# Patient Record
Sex: MALE | Race: BLACK OR AFRICAN AMERICAN | NOT HISPANIC OR LATINO | URBAN - METROPOLITAN AREA
[De-identification: names, ages, dates, MRNs, and addresses within clinical notes are randomized per-mention and may not be internally consistent; named-entity substitution may affect disease eponyms.]

---

## 2020-07-10 ENCOUNTER — OFFICE VISIT (OUTPATIENT)
Dept: URBAN - METROPOLITAN AREA CLINIC 80 | Facility: CLINIC | Age: 65
End: 2020-07-10
Payer: MEDICARE

## 2020-07-10 DIAGNOSIS — K92.1 HEMATOCHEZIA: ICD-10-CM

## 2020-07-10 DIAGNOSIS — K62.6 STERCORAL ULCER OF RECTUM: ICD-10-CM

## 2020-07-10 DIAGNOSIS — K59.01 CONSTIPATION: ICD-10-CM

## 2020-07-10 DIAGNOSIS — R13.10 DYSPHAGIA: ICD-10-CM

## 2020-07-10 DIAGNOSIS — Z86.010 HISTORY OF COLON POLYPS: ICD-10-CM

## 2020-07-10 DIAGNOSIS — K64.9 HEMORRHOIDS: ICD-10-CM

## 2020-07-10 DIAGNOSIS — R15.9 FECAL INCONTINENCE: ICD-10-CM

## 2020-07-10 DIAGNOSIS — B20 HIV (HUMAN IMMUNODEFICIENCY VIRUS INFECTION): ICD-10-CM

## 2020-07-10 DIAGNOSIS — K29.70 GASTRITIS: ICD-10-CM

## 2020-07-10 DIAGNOSIS — N18.9 CKD (CHRONIC KIDNEY DISEASE): ICD-10-CM

## 2020-07-10 PROCEDURE — 3017F COLORECTAL CA SCREEN DOC REV: CPT | Performed by: INTERNAL MEDICINE

## 2020-07-10 PROCEDURE — G9903 PT SCRN TBCO ID AS NON USER: HCPCS | Performed by: INTERNAL MEDICINE

## 2020-07-10 PROCEDURE — G8420 CALC BMI NORM PARAMETERS: HCPCS | Performed by: INTERNAL MEDICINE

## 2020-07-10 PROCEDURE — 46611 ANOSCOPY: CPT | Performed by: INTERNAL MEDICINE

## 2020-07-10 PROCEDURE — G8427 DOCREV CUR MEDS BY ELIG CLIN: HCPCS | Performed by: INTERNAL MEDICINE

## 2020-07-10 PROCEDURE — 99214 OFFICE O/P EST MOD 30 MIN: CPT | Performed by: INTERNAL MEDICINE

## 2020-07-10 RX ORDER — LUBIPROSTONE 8 UG/1
TAKE 1 CAPSULE (8 MCG) BY ORAL ROUTE 2 TIMES PER DAY WITH FOOD AND WATER FOR 90 DAYS CAPSULE, GELATIN COATED ORAL 2
Qty: 180 | Refills: 3 | Status: ACTIVE | COMMUNITY
Start: 2017-10-19

## 2020-07-10 RX ORDER — BIFIDOBACTERIUM LONGUM 10MM CELL
TAKE 1 CAPSULE BY ORAL ROUTE DAILY FOR 30 DAYS CAPSULE ORAL 1
Qty: 30 | Refills: 11 | Status: ACTIVE | COMMUNITY
Start: 2017-08-14

## 2020-07-10 RX ORDER — PANTOPRAZOLE SODIUM 20 MG
TAKE 1 TABLET (20 MG) BY ORAL ROUTE ONCE DAILY FOR 90 DAYS TABLET, DELAYED RELEASE (ENTERIC COATED) ORAL
Qty: 90 | Refills: 11 | Status: ACTIVE | COMMUNITY
Start: 2018-05-17

## 2020-07-10 RX ORDER — POLYETHYLENE GLYOCOL 3350, SODIUM CHLORIDE, SODIUM BICARBONATE AND POTASSIUM CHLORIDE 420; 11.2; 5.72; 1.48 G/4L; G/4L; G/4L; G/4L
AS DIRECTED POWDER, FOR SOLUTION NASOGASTRIC; ORAL ONCE
Qty: 420 | Refills: 0 | OUTPATIENT
Start: 2020-07-10

## 2020-07-10 RX ORDER — METRONIDAZOLE 500 MG/1
TAKE 1 TABLET (500 MG) BY ORAL ROUTE 2 TIMES PER DAY FOR 7 DAYS TABLET, FILM COATED ORAL 2
Qty: 14 | Refills: 0 | Status: ACTIVE | COMMUNITY
Start: 2017-08-14

## 2020-07-10 RX ORDER — PANTOPRAZOLE SODIUM 40 MG
TAKE 1 TABLET (40 MG) BY ORAL ROUTE ONCE DAILY FOR 90 DAYS TABLET, DELAYED RELEASE (ENTERIC COATED) ORAL
Qty: 90 | Refills: 3 | Status: ACTIVE | COMMUNITY
Start: 2018-05-22

## 2020-07-10 RX ORDER — FUROSEMIDE 40 MG/1
TABLET ORAL
Qty: 0 | Refills: 0 | Status: ACTIVE | COMMUNITY
Start: 1900-01-01

## 2020-07-10 NOTE — HPI-OTHER HISTORIES
The patient is a 65 year old /Black male , previously referred by Hilaria Lawson and Tran Babcock MD , comes in for hematochezia.  last seen in 2/2020.  here for follow up.   pt notes egd and flex sig ordered last time for surveillacnce did not get done he notes 2 episodes of TIA last noted on 6/23/2020 he comes in for hematochezia that started 4 days ago notes maroon stool with 5-6 bm/day it is ongoing with "purple" stool no assoc rectal pain he does note soft to formed stool he does not issues with control occ issues with initiating bm no assoc abd pain, nausea or vomiting normal appetite with no dysphagia he has lost  15 lb in the last few months attributed to his hospitalization and recent stroke h/h is stable with last labs on 7/7/2020 at 11.1/34.8  prior hospital admission for gi bleed in 10/2019 with  anemia with presyncope.  h/h was low at 6.7/20.1.   he had a h colonoscopy notable for internal hemorrhoids, 1.5 cm stercoral ulcer, a small polyp and melanosis.   he had an egd in 12/2019 with gastritis and duodenitis no follow up eval since.   hx of anal fissure tx with tx with amitiza and nitroglycerine cream.   He notes a colonoscopy > 5 yrs ago. he notes a large polyp found and he had a partial colectomy for it. repeat colonsocopy in 10/2016 with polyps noted. 3 year f/u noted. egd in 9/2016 with gastritis but no h pylori. recent hospital work up for bleeding with colonosocpy on 9/17/17 and again on 9/27/17 for avm tx. egd for abd pain on 9/27/17 with gastritis. no family history of colon cancer, colon polyps, or gastric cancer.

## 2020-07-10 NOTE — PHYSICAL EXAM GASTROINTESTINAL
Abdomen , soft, nontender, nondistended , no guarding or rigidity , no masses palpable , normal bowel sounds , Liver and Spleen , no hepatomegaly present , no hepatosplenomegaly , liver nontender , spleen not palpable   Rectal, pt laid in the left lateral position.  rectal exam with external skin tag on the left side.  phyllis with no anal fissure.  anoscopy with notes small amount of red blood, grade II internal hemorrhoids.  no rectal mass noted.

## 2020-07-29 ENCOUNTER — LAB OUTSIDE AN ENCOUNTER (OUTPATIENT)
Dept: URBAN - METROPOLITAN AREA CLINIC 80 | Facility: CLINIC | Age: 65
End: 2020-07-29

## 2020-07-29 ENCOUNTER — OFFICE VISIT (OUTPATIENT)
Dept: URBAN - METROPOLITAN AREA MEDICAL CENTER 25 | Facility: MEDICAL CENTER | Age: 65
End: 2020-07-29
Payer: MEDICARE

## 2020-07-29 DIAGNOSIS — D12.2 ADENOMA OF ASCENDING COLON: ICD-10-CM

## 2020-07-29 DIAGNOSIS — K29.40 ATROPHIC GASTRITIS: ICD-10-CM

## 2020-07-29 DIAGNOSIS — K55.20 ANGIODYSPLASIA: ICD-10-CM

## 2020-07-29 DIAGNOSIS — K92.1 BLACK STOOL: ICD-10-CM

## 2020-07-29 LAB
GLUCOSE POC: 84
PERFORMING LAB: (no result)

## 2020-07-29 PROCEDURE — 43239 EGD BIOPSY SINGLE/MULTIPLE: CPT | Performed by: INTERNAL MEDICINE

## 2020-07-29 PROCEDURE — 45388 COLONOSCOPY W/ABLATION: CPT | Performed by: INTERNAL MEDICINE

## 2020-07-29 PROCEDURE — 45385 COLONOSCOPY W/LESION REMOVAL: CPT | Performed by: INTERNAL MEDICINE

## 2020-07-29 PROCEDURE — G9937 DIG OR SURV COLSCO: HCPCS | Performed by: INTERNAL MEDICINE

## 2020-07-29 RX ORDER — BIFIDOBACTERIUM LONGUM 10MM CELL
TAKE 1 CAPSULE BY ORAL ROUTE DAILY FOR 30 DAYS CAPSULE ORAL 1
Qty: 30 | Refills: 11 | Status: ACTIVE | COMMUNITY
Start: 2017-08-14

## 2020-07-29 RX ORDER — POLYETHYLENE GLYOCOL 3350, SODIUM CHLORIDE, SODIUM BICARBONATE AND POTASSIUM CHLORIDE 420; 11.2; 5.72; 1.48 G/4L; G/4L; G/4L; G/4L
AS DIRECTED POWDER, FOR SOLUTION NASOGASTRIC; ORAL ONCE
Qty: 420 | Refills: 0 | Status: ACTIVE | COMMUNITY
Start: 2020-07-10

## 2020-07-29 RX ORDER — PANTOPRAZOLE SODIUM 40 MG
TAKE 1 TABLET (40 MG) BY ORAL ROUTE ONCE DAILY FOR 90 DAYS TABLET, DELAYED RELEASE (ENTERIC COATED) ORAL
Qty: 90 | Refills: 3 | Status: ACTIVE | COMMUNITY
Start: 2018-05-22

## 2020-07-29 RX ORDER — LUBIPROSTONE 8 UG/1
TAKE 1 CAPSULE (8 MCG) BY ORAL ROUTE 2 TIMES PER DAY WITH FOOD AND WATER FOR 90 DAYS CAPSULE, GELATIN COATED ORAL 2
Qty: 180 | Refills: 3 | Status: ACTIVE | COMMUNITY
Start: 2017-10-19

## 2020-07-29 RX ORDER — FUROSEMIDE 40 MG/1
TABLET ORAL
Qty: 0 | Refills: 0 | Status: ACTIVE | COMMUNITY
Start: 1900-01-01

## 2020-07-29 RX ORDER — METRONIDAZOLE 500 MG/1
TAKE 1 TABLET (500 MG) BY ORAL ROUTE 2 TIMES PER DAY FOR 7 DAYS TABLET, FILM COATED ORAL 2
Qty: 14 | Refills: 0 | Status: ACTIVE | COMMUNITY
Start: 2017-08-14

## 2020-07-29 RX ORDER — PANTOPRAZOLE SODIUM 20 MG
TAKE 1 TABLET (20 MG) BY ORAL ROUTE ONCE DAILY FOR 90 DAYS TABLET, DELAYED RELEASE (ENTERIC COATED) ORAL
Qty: 90 | Refills: 11 | Status: ACTIVE | COMMUNITY
Start: 2018-05-17

## 2020-09-14 ENCOUNTER — OFFICE VISIT (OUTPATIENT)
Dept: URBAN - METROPOLITAN AREA CLINIC 80 | Facility: CLINIC | Age: 65
End: 2020-09-14
Payer: MEDICARE

## 2020-09-14 DIAGNOSIS — R13.10 DYSPHAGIA: ICD-10-CM

## 2020-09-14 DIAGNOSIS — K92.1 HEMATOCHEZIA: ICD-10-CM

## 2020-09-14 DIAGNOSIS — Z86.010 HISTORY OF COLON POLYPS: ICD-10-CM

## 2020-09-14 DIAGNOSIS — K21.9 GERD: ICD-10-CM

## 2020-09-14 DIAGNOSIS — R06.6 HICCUPS: ICD-10-CM

## 2020-09-14 DIAGNOSIS — R15.9 FECAL INCONTINENCE: ICD-10-CM

## 2020-09-14 DIAGNOSIS — K64.9 HEMORRHOIDS: ICD-10-CM

## 2020-09-14 DIAGNOSIS — K63.89 MELANOSIS COLI: ICD-10-CM

## 2020-09-14 DIAGNOSIS — R18.8 ASCITES: ICD-10-CM

## 2020-09-14 DIAGNOSIS — K59.01 CONSTIPATION: ICD-10-CM

## 2020-09-14 DIAGNOSIS — K55.20 ANGIODYSPLASIA OF COLON: ICD-10-CM

## 2020-09-14 PROCEDURE — 3017F COLORECTAL CA SCREEN DOC REV: CPT | Performed by: INTERNAL MEDICINE

## 2020-09-14 PROCEDURE — G9903 PT SCRN TBCO ID AS NON USER: HCPCS | Performed by: INTERNAL MEDICINE

## 2020-09-14 PROCEDURE — G8427 DOCREV CUR MEDS BY ELIG CLIN: HCPCS | Performed by: INTERNAL MEDICINE

## 2020-09-14 PROCEDURE — 99213 OFFICE O/P EST LOW 20 MIN: CPT | Performed by: INTERNAL MEDICINE

## 2020-09-14 RX ORDER — BIFIDOBACTERIUM LONGUM 10MM CELL
TAKE 1 CAPSULE BY ORAL ROUTE DAILY FOR 30 DAYS CAPSULE ORAL 1
Qty: 30 | Refills: 11 | Status: ACTIVE | COMMUNITY
Start: 2017-08-14

## 2020-09-14 RX ORDER — LUBIPROSTONE 8 UG/1
TAKE 1 CAPSULE (8 MCG) BY ORAL ROUTE 2 TIMES PER DAY WITH FOOD AND WATER FOR 90 DAYS CAPSULE, GELATIN COATED ORAL 2
Qty: 180 | Refills: 3 | Status: ACTIVE | COMMUNITY
Start: 2017-10-19

## 2020-09-14 RX ORDER — PANTOPRAZOLE SODIUM 40 MG
TAKE 1 TABLET (40 MG) BY ORAL ROUTE ONCE DAILY FOR 90 DAYS TABLET, DELAYED RELEASE (ENTERIC COATED) ORAL
Qty: 90 | Refills: 3 | Status: ACTIVE | COMMUNITY
Start: 2018-05-22

## 2020-09-14 RX ORDER — FUROSEMIDE 40 MG/1
TABLET ORAL
Qty: 0 | Refills: 0 | Status: ACTIVE | COMMUNITY
Start: 1900-01-01

## 2020-09-14 RX ORDER — PANTOPRAZOLE SODIUM 20 MG
TAKE 1 TABLET (20 MG) BY ORAL ROUTE ONCE DAILY FOR 90 DAYS TABLET, DELAYED RELEASE (ENTERIC COATED) ORAL
Qty: 90 | Refills: 11 | Status: ACTIVE | COMMUNITY
Start: 2018-05-17

## 2020-09-14 RX ORDER — POLYETHYLENE GLYOCOL 3350, SODIUM CHLORIDE, SODIUM BICARBONATE AND POTASSIUM CHLORIDE 420; 11.2; 5.72; 1.48 G/4L; G/4L; G/4L; G/4L
AS DIRECTED POWDER, FOR SOLUTION NASOGASTRIC; ORAL ONCE
Qty: 420 | Refills: 0 | Status: ACTIVE | COMMUNITY
Start: 2020-07-10

## 2020-09-14 RX ORDER — METRONIDAZOLE 500 MG/1
TAKE 1 TABLET (500 MG) BY ORAL ROUTE 2 TIMES PER DAY FOR 7 DAYS TABLET, FILM COATED ORAL 2
Qty: 14 | Refills: 0 | Status: ACTIVE | COMMUNITY
Start: 2017-08-14

## 2020-09-14 NOTE — HPI-OTHER HISTORIES
The patient is a 65 year old /Black male , previously referred by Hilaria Lawson and Tran Babcock MD , comes in for follow up for hematochezia.    he had an egd/colon in 7/2020 noted rectal avm's.  tx with apc did well until this weekend had some bleeding this weekend for the first time x 2 days.  brbpr with stools had some diarrhea that resolved.   no abd pain no n/v appetite normal weight stable no new complaints  Prior hx of anemia requiring admission in 10/2019 with colonoscopy notable for internal hemorrhoids, 1.5 cm stercoral ulcer, a small polyp and melanosis.   he had an egd in 12/2019 with gastritis and duodenitis  previously starting banding of his hemorrhoid but did not want to continue banding at f/u.  He notes a colonoscopy > 5 yrs ago. he notes a large polyp found and he had a partial colectomy for it. repeat colonsocopy in 10/2016 with polyps noted. 3 year f/u noted. egd in 9/2016 with gastritis but no h pylori. recent hospital work up for bleeding with colonosocpy on 9/17/17 and again on 9/27/17 for avm tx. egd for abd pain on 9/27/17 with gastritis. no family history of colon cancer, colon polyps, or gastric cancer.

## 2020-09-15 LAB
HEMATOCRIT: 30.8
HEMOGLOBIN: 10
MCH: 30.3
MCHC: 32.5
MCV: 93
NRBC: (no result)
PLATELETS: 152
RBC: 3.3
RDW: 15.8
WBC: 4.5

## 2020-11-23 ENCOUNTER — ERX REFILL RESPONSE (OUTPATIENT)
Age: 65
End: 2020-11-23

## 2020-11-23 RX ORDER — PANTOPRAZOLE SODIUM 40 MG/1
TAKE 1 TABLET(40 MG) BY MOUTH EVERY DAY TABLET, DELAYED RELEASE ORAL
Qty: 90 | Refills: 0

## 2021-02-18 ENCOUNTER — ERX REFILL RESPONSE (OUTPATIENT)
Dept: URBAN - METROPOLITAN AREA CLINIC 13 | Facility: CLINIC | Age: 66
End: 2021-02-18

## 2021-02-18 RX ORDER — PANTOPRAZOLE SODIUM 40 MG/1
TAKE 1 TABLET(40 MG) BY MOUTH EVERY DAY TABLET, DELAYED RELEASE ORAL
Qty: 90 | Refills: 0

## 2021-05-19 ENCOUNTER — ERX REFILL RESPONSE (OUTPATIENT)
Dept: URBAN - METROPOLITAN AREA CLINIC 13 | Facility: CLINIC | Age: 66
End: 2021-05-19

## 2021-05-19 RX ORDER — PANTOPRAZOLE SODIUM 40 MG/1
TAKE 1 TABLET(40 MG) BY MOUTH EVERY DAY TABLET, DELAYED RELEASE ORAL
Qty: 90 | Refills: 0

## 2021-08-10 ENCOUNTER — ERX REFILL RESPONSE (OUTPATIENT)
Dept: URBAN - METROPOLITAN AREA CLINIC 80 | Facility: CLINIC | Age: 66
End: 2021-08-10

## 2021-08-10 RX ORDER — PANTOPRAZOLE SODIUM 40 MG/1
TAKE 1 TABLET(40 MG) BY MOUTH EVERY DAY TABLET, DELAYED RELEASE ORAL
Qty: 90 TABLET | Refills: 0 | OUTPATIENT

## 2021-08-10 RX ORDER — PANTOPRAZOLE SODIUM 40 MG/1
TAKE 1 TABLET(40 MG) BY MOUTH EVERY DAY TABLET, DELAYED RELEASE ORAL
Qty: 90 TABLET | Refills: 1 | OUTPATIENT

## 2021-10-06 ENCOUNTER — OFFICE VISIT (OUTPATIENT)
Dept: URBAN - METROPOLITAN AREA CLINIC 80 | Facility: CLINIC | Age: 66
End: 2021-10-06

## 2021-10-06 RX ORDER — PANTOPRAZOLE SODIUM 40 MG
TAKE 1 TABLET (40 MG) BY ORAL ROUTE ONCE DAILY FOR 90 DAYS TABLET, DELAYED RELEASE (ENTERIC COATED) ORAL
Qty: 90 | Refills: 3 | Status: ACTIVE | COMMUNITY
Start: 2018-05-22

## 2021-10-06 RX ORDER — BIFIDOBACTERIUM LONGUM 10MM CELL
TAKE 1 CAPSULE BY ORAL ROUTE DAILY FOR 30 DAYS CAPSULE ORAL 1
Qty: 30 | Refills: 11 | Status: ACTIVE | COMMUNITY
Start: 2017-08-14

## 2021-10-06 RX ORDER — POLYETHYLENE GLYOCOL 3350, SODIUM CHLORIDE, SODIUM BICARBONATE AND POTASSIUM CHLORIDE 420; 11.2; 5.72; 1.48 G/4L; G/4L; G/4L; G/4L
AS DIRECTED POWDER, FOR SOLUTION NASOGASTRIC; ORAL ONCE
Qty: 420 | Refills: 0 | Status: ACTIVE | COMMUNITY
Start: 2020-07-10

## 2021-10-06 RX ORDER — LUBIPROSTONE 8 UG/1
TAKE 1 CAPSULE (8 MCG) BY ORAL ROUTE 2 TIMES PER DAY WITH FOOD AND WATER FOR 90 DAYS CAPSULE, GELATIN COATED ORAL 2
Qty: 180 | Refills: 3 | Status: ACTIVE | COMMUNITY
Start: 2017-10-19

## 2021-10-06 RX ORDER — PANTOPRAZOLE SODIUM 20 MG
TAKE 1 TABLET (20 MG) BY ORAL ROUTE ONCE DAILY FOR 90 DAYS TABLET, DELAYED RELEASE (ENTERIC COATED) ORAL
Qty: 90 | Refills: 11 | Status: ACTIVE | COMMUNITY
Start: 2018-05-17

## 2021-10-06 RX ORDER — METRONIDAZOLE 500 MG/1
TAKE 1 TABLET (500 MG) BY ORAL ROUTE 2 TIMES PER DAY FOR 7 DAYS TABLET, FILM COATED ORAL 2
Qty: 14 | Refills: 0 | Status: ACTIVE | COMMUNITY
Start: 2017-08-14

## 2021-10-06 RX ORDER — PANTOPRAZOLE SODIUM 40 MG/1
TAKE 1 TABLET(40 MG) BY MOUTH EVERY DAY TABLET, DELAYED RELEASE ORAL
Qty: 90 TABLET | Refills: 1 | Status: ACTIVE | COMMUNITY

## 2021-10-06 RX ORDER — FUROSEMIDE 40 MG/1
TABLET ORAL
Qty: 0 | Refills: 0 | Status: ACTIVE | COMMUNITY
Start: 1900-01-01

## 2021-10-19 ENCOUNTER — OFFICE VISIT (OUTPATIENT)
Dept: URBAN - METROPOLITAN AREA CLINIC 80 | Facility: CLINIC | Age: 66
End: 2021-10-19

## 2021-10-19 RX ORDER — METRONIDAZOLE 500 MG/1
TAKE 1 TABLET (500 MG) BY ORAL ROUTE 2 TIMES PER DAY FOR 7 DAYS TABLET, FILM COATED ORAL 2
Qty: 14 | Refills: 0 | COMMUNITY
Start: 2017-08-14

## 2021-10-19 RX ORDER — PANTOPRAZOLE SODIUM 40 MG/1
TAKE 1 TABLET(40 MG) BY MOUTH EVERY DAY TABLET, DELAYED RELEASE ORAL
Qty: 90 TABLET | Refills: 1 | COMMUNITY

## 2021-10-19 RX ORDER — POLYETHYLENE GLYOCOL 3350, SODIUM CHLORIDE, SODIUM BICARBONATE AND POTASSIUM CHLORIDE 420; 11.2; 5.72; 1.48 G/4L; G/4L; G/4L; G/4L
AS DIRECTED POWDER, FOR SOLUTION NASOGASTRIC; ORAL ONCE
Qty: 420 | Refills: 0 | COMMUNITY
Start: 2020-07-10

## 2021-10-19 RX ORDER — FUROSEMIDE 40 MG/1
TABLET ORAL
Qty: 0 | Refills: 0 | COMMUNITY
Start: 1900-01-01

## 2021-10-19 RX ORDER — BIFIDOBACTERIUM LONGUM 10MM CELL
TAKE 1 CAPSULE BY ORAL ROUTE DAILY FOR 30 DAYS CAPSULE ORAL 1
Qty: 30 | Refills: 11 | COMMUNITY
Start: 2017-08-14

## 2021-10-19 RX ORDER — PANTOPRAZOLE SODIUM 40 MG
TAKE 1 TABLET (40 MG) BY ORAL ROUTE ONCE DAILY FOR 90 DAYS TABLET, DELAYED RELEASE (ENTERIC COATED) ORAL
Qty: 90 | Refills: 3 | COMMUNITY
Start: 2018-05-22

## 2021-10-19 RX ORDER — PANTOPRAZOLE SODIUM 20 MG
TAKE 1 TABLET (20 MG) BY ORAL ROUTE ONCE DAILY FOR 90 DAYS TABLET, DELAYED RELEASE (ENTERIC COATED) ORAL
Qty: 90 | Refills: 11 | COMMUNITY
Start: 2018-05-17

## 2021-10-19 RX ORDER — LUBIPROSTONE 8 UG/1
TAKE 1 CAPSULE (8 MCG) BY ORAL ROUTE 2 TIMES PER DAY WITH FOOD AND WATER FOR 90 DAYS CAPSULE, GELATIN COATED ORAL 2
Qty: 180 | Refills: 3 | COMMUNITY
Start: 2017-10-19

## 2021-11-17 ENCOUNTER — OFFICE VISIT (OUTPATIENT)
Dept: URBAN - METROPOLITAN AREA CLINIC 80 | Facility: CLINIC | Age: 66
End: 2021-11-17

## 2021-11-24 ENCOUNTER — ERX REFILL RESPONSE (OUTPATIENT)
Dept: URBAN - METROPOLITAN AREA CLINIC 80 | Facility: CLINIC | Age: 66
End: 2021-11-24

## 2021-11-24 RX ORDER — PANTOPRAZOLE SODIUM 40 MG/1
TAKE 1 TABLET(40 MG) BY MOUTH EVERY DAY TABLET, DELAYED RELEASE ORAL
Qty: 90 TABLET | Refills: 1 | OUTPATIENT

## 2022-01-12 ENCOUNTER — LAB OUTSIDE AN ENCOUNTER (OUTPATIENT)
Dept: URBAN - METROPOLITAN AREA CLINIC 80 | Facility: CLINIC | Age: 67
End: 2022-01-12

## 2022-01-12 ENCOUNTER — WEB ENCOUNTER (OUTPATIENT)
Dept: URBAN - METROPOLITAN AREA CLINIC 80 | Facility: CLINIC | Age: 67
End: 2022-01-12

## 2022-01-12 ENCOUNTER — OFFICE VISIT (OUTPATIENT)
Dept: URBAN - METROPOLITAN AREA CLINIC 80 | Facility: CLINIC | Age: 67
End: 2022-01-12
Payer: MEDICARE

## 2022-01-12 DIAGNOSIS — K59.01 CONSTIPATION: ICD-10-CM

## 2022-01-12 DIAGNOSIS — K21.9 GERD: ICD-10-CM

## 2022-01-12 DIAGNOSIS — K92.1 HEMATOCHEZIA: ICD-10-CM

## 2022-01-12 DIAGNOSIS — R15.9 FECAL INCONTINENCE: ICD-10-CM

## 2022-01-12 DIAGNOSIS — K55.20 ANGIODYSPLASIA OF COLON: ICD-10-CM

## 2022-01-12 DIAGNOSIS — K64.9 HEMORRHOIDS: ICD-10-CM

## 2022-01-12 DIAGNOSIS — K63.89 MELANOSIS COLI: ICD-10-CM

## 2022-01-12 DIAGNOSIS — R06.6 HICCUPS: ICD-10-CM

## 2022-01-12 DIAGNOSIS — R13.10 DYSPHAGIA: ICD-10-CM

## 2022-01-12 DIAGNOSIS — Z86.010 HISTORY OF COLON POLYPS: ICD-10-CM

## 2022-01-12 DIAGNOSIS — D64.9 ANEMIA, UNSPECIFIED TYPE: ICD-10-CM

## 2022-01-12 DIAGNOSIS — R18.8 ASCITES: ICD-10-CM

## 2022-01-12 PROCEDURE — 99214 OFFICE O/P EST MOD 30 MIN: CPT | Performed by: PHYSICIAN ASSISTANT

## 2022-01-12 RX ORDER — POLYETHYLENE GLYOCOL 3350, SODIUM CHLORIDE, SODIUM BICARBONATE AND POTASSIUM CHLORIDE 420; 11.2; 5.72; 1.48 G/4L; G/4L; G/4L; G/4L
AS DIRECTED POWDER, FOR SOLUTION NASOGASTRIC; ORAL ONCE
Qty: 420 | Refills: 0 | Status: DISCONTINUED | COMMUNITY
Start: 2020-07-10

## 2022-01-12 RX ORDER — LUBIPROSTONE 8 UG/1
TAKE 1 CAPSULE (8 MCG) BY ORAL ROUTE 2 TIMES PER DAY WITH FOOD AND WATER FOR 90 DAYS CAPSULE, GELATIN COATED ORAL 2
Qty: 180 | Refills: 3 | Status: DISCONTINUED | COMMUNITY
Start: 2017-10-19

## 2022-01-12 RX ORDER — BIFIDOBACTERIUM LONGUM 10MM CELL
TAKE 1 CAPSULE BY ORAL ROUTE DAILY FOR 30 DAYS CAPSULE ORAL 1
Qty: 30 | Refills: 11 | Status: DISCONTINUED | COMMUNITY
Start: 2017-08-14

## 2022-01-12 RX ORDER — METRONIDAZOLE 500 MG/1
TAKE 1 TABLET (500 MG) BY ORAL ROUTE 2 TIMES PER DAY FOR 7 DAYS TABLET, FILM COATED ORAL 2
Qty: 14 | Refills: 0 | Status: DISCONTINUED | COMMUNITY
Start: 2017-08-14

## 2022-01-12 RX ORDER — FUROSEMIDE 40 MG/1
TABLET ORAL
Qty: 0 | Refills: 0 | Status: DISCONTINUED | COMMUNITY
Start: 1900-01-01

## 2022-01-12 RX ORDER — PANTOPRAZOLE SODIUM 20 MG
TAKE 1 TABLET (20 MG) BY ORAL ROUTE ONCE DAILY FOR 90 DAYS TABLET, DELAYED RELEASE (ENTERIC COATED) ORAL
Qty: 90 | Refills: 11 | Status: DISCONTINUED | COMMUNITY
Start: 2018-05-17

## 2022-01-12 RX ORDER — PANTOPRAZOLE SODIUM 40 MG/1
TAKE 1 TABLET(40 MG) BY MOUTH EVERY DAY TABLET, DELAYED RELEASE ORAL
Qty: 90 TABLET | Refills: 1 | Status: ACTIVE | COMMUNITY

## 2022-01-12 RX ORDER — PANTOPRAZOLE SODIUM 40 MG
TAKE 1 TABLET (40 MG) BY ORAL ROUTE ONCE DAILY FOR 90 DAYS TABLET, DELAYED RELEASE (ENTERIC COATED) ORAL
Qty: 90 | Refills: 3 | Status: DISCONTINUED | COMMUNITY
Start: 2018-05-22

## 2022-01-12 NOTE — HPI-TODAY'S VISIT:
This is a patient with a longstanding history of GERD, hematochezia, anemia, constipation and colon polyps.  His last upper endoscopy was in July 2020 with gastritis.  His last colonoscopy was in July 2020 and was advised for a 3-year follow-up.  In the past he has had small polyps, partial colon resection for polyps, internal hemorrhoids, rectal AVMs requiring APC, and stercoral ulcer with melanosis.  He went to the emergency room on seven 12/20/2021 for left lower quadrant abdominal pain.  He does have a history of end-stage renal disease on dialysis.  In the emergency room blood work showed a hemoglobin of 8.7, hematocrit 26.9, white count 3.48.  He had a CT of his abdomen and pelvis with contrast that showed significant fecal waste which is compatible with constipation and there was diverticulosis.  Looking at his blood work over the last months in June 2021 his hemoglobin was 13.2 and hematocrit 38.6 and then on 12/22/2021 his hemoglobin was 8.2 and hematocrit 25.3.  He does have HIV with a viral load of less than 20. He states since out of the ER his is passing better stools - he bought some ducolax over the counter and it helped him go - he is not taking it anymore - just used it to clean out He is still having some pain on the left side - it feels like the diverticulitis he had that he had a partial colon resection for in the past Pain did not decrease much after he finally had his BM - still there He is having 2-3 BM a day - not on any meds for it - he has changed his diet around some - eating more greens He had no blood in his stool when he strained - then after "the dam broke" he had blood when he wiped.  Did not see any yesterday or today.  It comes and goes No rectal pain, no nausea or emesis No fevers or chills Stools are always dark - because on a binder for his ESRD

## 2022-02-22 ENCOUNTER — ERX REFILL RESPONSE (OUTPATIENT)
Dept: URBAN - METROPOLITAN AREA CLINIC 80 | Facility: CLINIC | Age: 67
End: 2022-02-22

## 2022-02-22 RX ORDER — PANTOPRAZOLE SODIUM 40 MG/1
TAKE 1 TABLET(40 MG) BY MOUTH EVERY DAY TABLET, DELAYED RELEASE ORAL
Qty: 90 TABLET | Refills: 4 | OUTPATIENT

## 2022-02-22 RX ORDER — PANTOPRAZOLE SODIUM 40 MG/1
TAKE 1 TABLET(40 MG) BY MOUTH EVERY DAY TABLET, DELAYED RELEASE ORAL
Qty: 90 TABLET | Refills: 1 | OUTPATIENT

## 2022-03-21 ENCOUNTER — OFFICE VISIT (OUTPATIENT)
Dept: URBAN - METROPOLITAN AREA MEDICAL CENTER 18 | Facility: MEDICAL CENTER | Age: 67
End: 2022-03-21

## 2023-01-09 ENCOUNTER — CLAIMS CREATED FROM THE CLAIM WINDOW (OUTPATIENT)
Dept: URBAN - METROPOLITAN AREA MEDICAL CENTER 18 | Facility: MEDICAL CENTER | Age: 68
End: 2023-01-09
Payer: MEDICARE

## 2023-01-09 DIAGNOSIS — K92.1 ACUTE MELENA: ICD-10-CM

## 2023-01-09 PROCEDURE — 99222 1ST HOSP IP/OBS MODERATE 55: CPT | Performed by: PHYSICIAN ASSISTANT

## 2023-01-09 PROCEDURE — G8427 DOCREV CUR MEDS BY ELIG CLIN: HCPCS | Performed by: PHYSICIAN ASSISTANT

## 2023-01-10 ENCOUNTER — CLAIMS CREATED FROM THE CLAIM WINDOW (OUTPATIENT)
Dept: URBAN - METROPOLITAN AREA MEDICAL CENTER 18 | Facility: MEDICAL CENTER | Age: 68
End: 2023-01-10
Payer: MEDICARE

## 2023-01-10 DIAGNOSIS — D64.89 ANEMIA DUE TO OTHER CAUSE: ICD-10-CM

## 2023-01-10 DIAGNOSIS — D64.9 ANEMIA, UNSPECIFIED: ICD-10-CM

## 2023-01-10 DIAGNOSIS — K92.2 GASTROINTESTINAL HEMORRHAGE, UNSPECIFIED: ICD-10-CM

## 2023-01-10 DIAGNOSIS — K62.5 ANAL BLEEDING: ICD-10-CM

## 2023-01-10 PROCEDURE — 99231 SBSQ HOSP IP/OBS SF/LOW 25: CPT | Performed by: PHYSICIAN ASSISTANT

## 2023-01-10 PROCEDURE — 99231 SBSQ HOSP IP/OBS SF/LOW 25: CPT | Performed by: INTERNAL MEDICINE

## 2023-01-11 ENCOUNTER — CLAIMS CREATED FROM THE CLAIM WINDOW (OUTPATIENT)
Dept: URBAN - METROPOLITAN AREA MEDICAL CENTER 18 | Facility: MEDICAL CENTER | Age: 68
End: 2023-01-11
Payer: MEDICARE

## 2023-01-11 DIAGNOSIS — K62.5 ANAL BLEEDING: ICD-10-CM

## 2023-01-11 DIAGNOSIS — K92.2 GASTROINTESTINAL HEMORRHAGE, UNSPECIFIED: ICD-10-CM

## 2023-01-11 PROCEDURE — 45378 DIAGNOSTIC COLONOSCOPY: CPT | Performed by: INTERNAL MEDICINE

## 2023-01-12 ENCOUNTER — CLAIMS CREATED FROM THE CLAIM WINDOW (OUTPATIENT)
Dept: URBAN - METROPOLITAN AREA MEDICAL CENTER 18 | Facility: MEDICAL CENTER | Age: 68
End: 2023-01-12
Payer: MEDICARE

## 2023-01-12 ENCOUNTER — P2P PATIENT RECORD (OUTPATIENT)
Age: 68
End: 2023-01-12

## 2023-01-12 DIAGNOSIS — K92.2 GASTROINTESTINAL HEMORRHAGE, UNSPECIFIED: ICD-10-CM

## 2023-01-12 DIAGNOSIS — K62.5 ANAL BLEEDING: ICD-10-CM

## 2023-01-12 PROCEDURE — 99231 SBSQ HOSP IP/OBS SF/LOW 25: CPT | Performed by: INTERNAL MEDICINE

## 2023-01-12 PROCEDURE — 99231 SBSQ HOSP IP/OBS SF/LOW 25: CPT | Performed by: PHYSICIAN ASSISTANT

## 2023-02-10 ENCOUNTER — TELEPHONE ENCOUNTER (OUTPATIENT)
Dept: URBAN - METROPOLITAN AREA CLINIC 92 | Facility: CLINIC | Age: 68
End: 2023-02-10

## 2023-02-10 ENCOUNTER — ERX REFILL RESPONSE (OUTPATIENT)
Dept: URBAN - METROPOLITAN AREA CLINIC 80 | Facility: CLINIC | Age: 68
End: 2023-02-10

## 2023-02-10 RX ORDER — PANTOPRAZOLE SODIUM 40 MG/1
TAKE 1 TABLET(40 MG) BY MOUTH EVERY DAY TABLET, DELAYED RELEASE ORAL
Qty: 90 TABLET | Refills: 2 | OUTPATIENT

## 2023-02-10 RX ORDER — PANTOPRAZOLE SODIUM 40 MG/1
TAKE 1 TABLET(40 MG) BY MOUTH EVERY DAY TABLET, DELAYED RELEASE ORAL
Qty: 90 TABLET | Refills: 2

## 2023-03-08 ENCOUNTER — WEB ENCOUNTER (OUTPATIENT)
Dept: URBAN - METROPOLITAN AREA CLINIC 80 | Facility: CLINIC | Age: 68
End: 2023-03-08

## 2023-03-08 ENCOUNTER — LAB OUTSIDE AN ENCOUNTER (OUTPATIENT)
Dept: URBAN - METROPOLITAN AREA CLINIC 80 | Facility: CLINIC | Age: 68
End: 2023-03-08

## 2023-03-08 ENCOUNTER — OFFICE VISIT (OUTPATIENT)
Dept: URBAN - METROPOLITAN AREA CLINIC 80 | Facility: CLINIC | Age: 68
End: 2023-03-08
Payer: MEDICARE

## 2023-03-08 ENCOUNTER — OFFICE VISIT (OUTPATIENT)
Dept: URBAN - METROPOLITAN AREA CLINIC 80 | Facility: CLINIC | Age: 68
End: 2023-03-08

## 2023-03-08 VITALS
HEART RATE: 70 BPM | BODY MASS INDEX: 20.18 KG/M2 | DIASTOLIC BLOOD PRESSURE: 64 MMHG | WEIGHT: 128.6 LBS | TEMPERATURE: 97.3 F | SYSTOLIC BLOOD PRESSURE: 127 MMHG | HEIGHT: 67 IN

## 2023-03-08 DIAGNOSIS — K21.9 GERD: ICD-10-CM

## 2023-03-08 DIAGNOSIS — K92.1 HEMATOCHEZIA: ICD-10-CM

## 2023-03-08 DIAGNOSIS — R13.10 DYSPHAGIA: ICD-10-CM

## 2023-03-08 DIAGNOSIS — K64.9 HEMORRHOIDS: ICD-10-CM

## 2023-03-08 DIAGNOSIS — D64.9 ANEMIA, UNSPECIFIED TYPE: ICD-10-CM

## 2023-03-08 DIAGNOSIS — K59.01 CONSTIPATION: ICD-10-CM

## 2023-03-08 DIAGNOSIS — K55.20 ANGIODYSPLASIA OF COLON: ICD-10-CM

## 2023-03-08 DIAGNOSIS — Z86.010 HISTORY OF COLON POLYPS: ICD-10-CM

## 2023-03-08 DIAGNOSIS — K63.89 MELANOSIS COLI: ICD-10-CM

## 2023-03-08 DIAGNOSIS — R06.6 HICCUPS: ICD-10-CM

## 2023-03-08 DIAGNOSIS — R15.9 FECAL INCONTINENCE: ICD-10-CM

## 2023-03-08 DIAGNOSIS — R18.8 ASCITES: ICD-10-CM

## 2023-03-08 PROBLEM — 235595009 GASTROESOPHAGEAL REFLUX DISEASE: Status: ACTIVE | Noted: 2022-01-12

## 2023-03-08 PROBLEM — 14760008 CONSTIPATION: Status: ACTIVE | Noted: 2022-01-12

## 2023-03-08 PROBLEM — 389026000 ASCITES: Status: ACTIVE | Noted: 2022-01-12

## 2023-03-08 PROBLEM — 40739000 DYSPHAGIA: Status: ACTIVE | Noted: 2022-01-12

## 2023-03-08 PROCEDURE — 99214 OFFICE O/P EST MOD 30 MIN: CPT | Performed by: PHYSICIAN ASSISTANT

## 2023-03-08 RX ORDER — PANTOPRAZOLE SODIUM 40 MG/1
1 TABLET TABLET, DELAYED RELEASE ORAL ONCE A DAY
Qty: 90 TABLET | Refills: 3 | OUTPATIENT
Start: 2023-03-08

## 2023-03-08 RX ORDER — POLYETHYLENE GLYCOL 3350, SODIUM SULFATE, SODIUM CHLORIDE, POTASSIUM CHLORIDE, ASCORBIC ACID, SODIUM ASCORBATE 140-9-5.2G
AS DIRECTED KIT ORAL 1
Qty: 1 | Refills: 0 | OUTPATIENT
Start: 2023-03-08 | End: 2023-03-09

## 2023-03-08 RX ORDER — HYOSCYAMINE SULFATE 0.12 MG/1
1 TABLET UNDER THE TONGUE AND ALLOW TO DISSOLVE  AS NEEDED TABLET SUBLINGUAL
Qty: 90 | Refills: 6 | OUTPATIENT
Start: 2023-03-08 | End: 2023-10-04

## 2023-03-08 RX ORDER — PANTOPRAZOLE SODIUM 40 MG/1
TAKE 1 TABLET(40 MG) BY MOUTH EVERY DAY TABLET, DELAYED RELEASE ORAL
Qty: 90 TABLET | Refills: 2 | Status: ACTIVE | COMMUNITY

## 2023-03-08 NOTE — HPI-TODAY'S VISIT:
Pt states there are times when he has increased urgency to have a BM and there are times he does not make it to the restroom in time - this has been for a few years now but getting worse he has 2 BM a day he takes binders for his kidney disease and it causes his stool to be black - so he never knows if blood or not he gets iron at dialysis In Jan hgb 9.1, on 2/23/23 it was 11 He was admitted to the hospital in 2/23 for hyperkalemia  no nausea or emesis heartburn doing well as long as taking the Pantoprazole he gets dialysis T, TH, Sat no abd pain, occasional soreness, but nothing new or bad

## 2023-03-09 PROBLEM — 72042002 BOWEL INCONTINENCE: Status: ACTIVE | Noted: 2022-01-12

## 2023-03-09 PROBLEM — 428283002 HISTORY OF POLYP OF COLON: Status: ACTIVE | Noted: 2022-01-12

## 2023-03-14 ENCOUNTER — TELEPHONE ENCOUNTER (OUTPATIENT)
Dept: URBAN - METROPOLITAN AREA CLINIC 79 | Facility: CLINIC | Age: 68
End: 2023-03-14

## 2023-03-15 ENCOUNTER — TELEPHONE ENCOUNTER (OUTPATIENT)
Dept: URBAN - METROPOLITAN AREA CLINIC 35 | Facility: CLINIC | Age: 68
End: 2023-03-15

## 2023-03-15 ENCOUNTER — OFFICE VISIT (OUTPATIENT)
Dept: URBAN - METROPOLITAN AREA MEDICAL CENTER 18 | Facility: MEDICAL CENTER | Age: 68
End: 2023-03-15
Payer: MEDICARE

## 2023-03-15 DIAGNOSIS — R13.19 CERVICAL DYSPHAGIA: ICD-10-CM

## 2023-03-15 PROCEDURE — 992 APS NON BILLABLE: Performed by: INTERNAL MEDICINE

## 2023-03-15 RX ORDER — PANTOPRAZOLE SODIUM 40 MG/1
TAKE 1 TABLET(40 MG) BY MOUTH EVERY DAY TABLET, DELAYED RELEASE ORAL
Qty: 90 TABLET | Refills: 2 | Status: ACTIVE | COMMUNITY

## 2023-03-15 RX ORDER — PANTOPRAZOLE SODIUM 40 MG/1
1 TABLET TABLET, DELAYED RELEASE ORAL ONCE A DAY
Qty: 90 TABLET | Refills: 3 | Status: ACTIVE | COMMUNITY
Start: 2023-03-08

## 2023-03-15 RX ORDER — HYOSCYAMINE SULFATE 0.12 MG/1
1 TABLET UNDER THE TONGUE AND ALLOW TO DISSOLVE  AS NEEDED TABLET SUBLINGUAL
Qty: 90 | Refills: 6 | Status: ACTIVE | COMMUNITY
Start: 2023-03-08 | End: 2023-10-04

## 2023-08-02 ENCOUNTER — CLAIMS CREATED FROM THE CLAIM WINDOW (OUTPATIENT)
Dept: URBAN - METROPOLITAN AREA MEDICAL CENTER 18 | Facility: MEDICAL CENTER | Age: 68
End: 2023-08-02
Payer: MEDICARE

## 2023-08-02 DIAGNOSIS — R10.84 GENERALIZED ABDOMINAL PAIN: ICD-10-CM

## 2023-08-02 DIAGNOSIS — R19.7 DIARRHEA, UNSPECIFIED: ICD-10-CM

## 2023-08-02 PROCEDURE — G8427 DOCREV CUR MEDS BY ELIG CLIN: HCPCS | Performed by: PHYSICIAN ASSISTANT

## 2023-08-02 PROCEDURE — 99254 IP/OBS CNSLTJ NEW/EST MOD 60: CPT | Performed by: PHYSICIAN ASSISTANT

## 2023-08-02 PROCEDURE — 99222 1ST HOSP IP/OBS MODERATE 55: CPT | Performed by: PHYSICIAN ASSISTANT

## 2023-08-03 ENCOUNTER — CLAIMS CREATED FROM THE CLAIM WINDOW (OUTPATIENT)
Dept: URBAN - METROPOLITAN AREA MEDICAL CENTER 18 | Facility: MEDICAL CENTER | Age: 68
End: 2023-08-03
Payer: MEDICARE

## 2023-08-03 DIAGNOSIS — R10.84 ABDOMINAL CRAMPING, GENERALIZED: ICD-10-CM

## 2023-08-03 DIAGNOSIS — R19.7 DIARRHEA, UNSPECIFIED: ICD-10-CM

## 2023-08-03 PROCEDURE — 99232 SBSQ HOSP IP/OBS MODERATE 35: CPT | Performed by: PHYSICIAN ASSISTANT

## 2023-08-17 ENCOUNTER — OFFICE VISIT (OUTPATIENT)
Dept: URBAN - METROPOLITAN AREA CLINIC 80 | Facility: CLINIC | Age: 68
End: 2023-08-17

## 2023-08-18 ENCOUNTER — CLAIMS CREATED FROM THE CLAIM WINDOW (OUTPATIENT)
Dept: URBAN - METROPOLITAN AREA MEDICAL CENTER 18 | Facility: MEDICAL CENTER | Age: 68
End: 2023-08-18
Payer: MEDICARE

## 2023-08-18 DIAGNOSIS — A04.72 C. DIFFICILE: ICD-10-CM

## 2023-08-18 PROCEDURE — G8427 DOCREV CUR MEDS BY ELIG CLIN: HCPCS | Performed by: PHYSICIAN ASSISTANT

## 2023-08-18 PROCEDURE — 99222 1ST HOSP IP/OBS MODERATE 55: CPT | Performed by: PHYSICIAN ASSISTANT

## 2023-08-18 PROCEDURE — 99254 IP/OBS CNSLTJ NEW/EST MOD 60: CPT | Performed by: PHYSICIAN ASSISTANT

## 2023-08-19 ENCOUNTER — CLAIMS CREATED FROM THE CLAIM WINDOW (OUTPATIENT)
Dept: URBAN - METROPOLITAN AREA MEDICAL CENTER 18 | Facility: MEDICAL CENTER | Age: 68
End: 2023-08-19
Payer: MEDICARE

## 2023-08-19 DIAGNOSIS — A04.72 C. DIFFICILE: ICD-10-CM

## 2023-08-19 PROCEDURE — 99232 SBSQ HOSP IP/OBS MODERATE 35: CPT | Performed by: PHYSICIAN ASSISTANT

## 2023-08-20 ENCOUNTER — P2P PATIENT RECORD (OUTPATIENT)
Age: 68
End: 2023-08-20

## 2023-09-13 ENCOUNTER — OFFICE VISIT (OUTPATIENT)
Dept: URBAN - METROPOLITAN AREA CLINIC 80 | Facility: CLINIC | Age: 68
End: 2023-09-13

## 2023-09-28 ENCOUNTER — ERX REFILL RESPONSE (OUTPATIENT)
Dept: URBAN - METROPOLITAN AREA CLINIC 80 | Facility: CLINIC | Age: 68
End: 2023-09-28

## 2023-09-28 RX ORDER — HYOSCYAMINE SULFATE 0.12 MG/1
DISSOLVE 1 TABLET UNDER THE TONGUE BEFORE MEALS AS NEEDED TABLET SUBLINGUAL
Qty: 90 TABLET | Refills: 0 | OUTPATIENT

## 2023-09-28 RX ORDER — HYOSCYAMINE SULFATE 0.12 MG/1
DISSOLVE 1 TABLET UNDER THE TONGUE BEFORE MEALS AS NEEDED TABLET SUBLINGUAL
Qty: 270 TABLET | Refills: 0 | OUTPATIENT

## 2023-09-28 RX ORDER — HYOSCYAMINE SULFATE 0.12 MG/1
1 TABLET UNDER THE TONGUE AND ALLOW TO DISSOLVE  AS NEEDED TABLET SUBLINGUAL
Qty: 90 | Refills: 6 | OUTPATIENT

## 2023-10-04 ENCOUNTER — OFFICE VISIT (OUTPATIENT)
Dept: URBAN - METROPOLITAN AREA CLINIC 80 | Facility: CLINIC | Age: 68
End: 2023-10-04

## 2023-11-07 ENCOUNTER — OFFICE VISIT (OUTPATIENT)
Dept: URBAN - METROPOLITAN AREA CLINIC 80 | Facility: CLINIC | Age: 68
End: 2023-11-07

## 2023-11-30 ENCOUNTER — DASHBOARD ENCOUNTERS (OUTPATIENT)
Age: 68
End: 2023-11-30

## 2023-11-30 ENCOUNTER — CLAIMS CREATED FROM THE CLAIM WINDOW (OUTPATIENT)
Dept: URBAN - METROPOLITAN AREA CLINIC 80 | Facility: CLINIC | Age: 68
End: 2023-11-30
Payer: MEDICARE

## 2023-11-30 VITALS
BODY MASS INDEX: 20.53 KG/M2 | SYSTOLIC BLOOD PRESSURE: 89 MMHG | HEART RATE: 80 BPM | HEIGHT: 67 IN | DIASTOLIC BLOOD PRESSURE: 57 MMHG | TEMPERATURE: 98.4 F | WEIGHT: 130.8 LBS

## 2023-11-30 DIAGNOSIS — R13.19 CERVICAL DYSPHAGIA: ICD-10-CM

## 2023-11-30 DIAGNOSIS — Z86.010 HISTORY OF COLON POLYPS: ICD-10-CM

## 2023-11-30 DIAGNOSIS — K59.01 CONSTIPATION: ICD-10-CM

## 2023-11-30 DIAGNOSIS — R18.8 ASCITES: ICD-10-CM

## 2023-11-30 DIAGNOSIS — K92.1 HEMATOCHEZIA: ICD-10-CM

## 2023-11-30 DIAGNOSIS — K21.9 GERD: ICD-10-CM

## 2023-11-30 DIAGNOSIS — K64.9 HEMORRHOIDS: ICD-10-CM

## 2023-11-30 DIAGNOSIS — D64.9 ANEMIA, UNSPECIFIED TYPE: ICD-10-CM

## 2023-11-30 DIAGNOSIS — K63.89 MELANOSIS COLI: ICD-10-CM

## 2023-11-30 DIAGNOSIS — R15.9 FECAL INCONTINENCE: ICD-10-CM

## 2023-11-30 DIAGNOSIS — K55.20 ANGIODYSPLASIA OF COLON: ICD-10-CM

## 2023-11-30 DIAGNOSIS — R13.10 DYSPHAGIA: ICD-10-CM

## 2023-11-30 DIAGNOSIS — R19.7 INTERMITTENT DIARRHEA: ICD-10-CM

## 2023-11-30 DIAGNOSIS — R06.6 HICCUPS: ICD-10-CM

## 2023-11-30 PROCEDURE — 99213 OFFICE O/P EST LOW 20 MIN: CPT | Performed by: PHYSICIAN ASSISTANT

## 2023-11-30 RX ORDER — PANTOPRAZOLE SODIUM 40 MG/1
1 TABLET TABLET, DELAYED RELEASE ORAL ONCE A DAY
Qty: 90 TABLET | Refills: 3 | Status: DISCONTINUED | COMMUNITY
Start: 2023-03-08

## 2023-11-30 RX ORDER — HYOSCYAMINE SULFATE 0.12 MG/1
DISSOLVE 1 TABLET UNDER THE TONGUE BEFORE MEALS AS NEEDED TABLET SUBLINGUAL
Qty: 270 TABLET | Refills: 0 | Status: ACTIVE | COMMUNITY

## 2023-11-30 RX ORDER — PANTOPRAZOLE SODIUM 40 MG/1
TAKE 1 TABLET(40 MG) BY MOUTH EVERY DAY TABLET, DELAYED RELEASE ORAL
Qty: 90 TABLET | Refills: 2 | Status: ACTIVE | COMMUNITY

## 2023-11-30 NOTE — HPI-TODAY'S VISIT:
He had dialysis today - his BP was low there to 76/60 - they took off too much fluid - was at 108/66 when he left - now it is 89/57 he feels a little dizzy right now no shortness of breath Pt had diarrhea in August - was in hospital - had c. diff - treated he is having 2 BM a day - formed but more like slime and mucus than stool no abd pain no nausea or emesis no fevers or chills no brbpr or melena

## 2023-12-20 ENCOUNTER — ERX REFILL RESPONSE (OUTPATIENT)
Dept: URBAN - METROPOLITAN AREA CLINIC 80 | Facility: CLINIC | Age: 68
End: 2023-12-20

## 2023-12-20 RX ORDER — HYOSCYAMINE SULFATE 0.12 MG/1
DISSOLVE 1 TABLET UNDER THE TONGUE BEFORE MEALS TABLET SUBLINGUAL
Qty: 270 | Refills: 1 | OUTPATIENT

## 2023-12-20 RX ORDER — HYOSCYAMINE SULFATE 0.12 MG/1
DISSOLVE 1 TABLET UNDER THE TONGUE BEFORE MEALS AS NEEDED TABLET SUBLINGUAL
Qty: 270 TABLET | Refills: 0 | OUTPATIENT

## 2024-04-10 ENCOUNTER — LAB (OUTPATIENT)
Dept: URBAN - METROPOLITAN AREA MEDICAL CENTER 12 | Facility: MEDICAL CENTER | Age: 69
End: 2024-04-10
Payer: MEDICARE

## 2024-04-10 DIAGNOSIS — R93.3 ABN FINDINGS-GI TRACT: ICD-10-CM

## 2024-04-10 PROCEDURE — 43235 EGD DIAGNOSTIC BRUSH WASH: CPT | Performed by: STUDENT IN AN ORGANIZED HEALTH CARE EDUCATION/TRAINING PROGRAM

## 2024-04-10 PROCEDURE — 99254 IP/OBS CNSLTJ NEW/EST MOD 60: CPT | Performed by: STUDENT IN AN ORGANIZED HEALTH CARE EDUCATION/TRAINING PROGRAM

## 2024-04-10 PROCEDURE — 99214 OFFICE O/P EST MOD 30 MIN: CPT | Performed by: STUDENT IN AN ORGANIZED HEALTH CARE EDUCATION/TRAINING PROGRAM

## 2024-06-18 ENCOUNTER — ERX REFILL RESPONSE (OUTPATIENT)
Dept: URBAN - METROPOLITAN AREA CLINIC 80 | Facility: CLINIC | Age: 69
End: 2024-06-18

## 2024-06-18 RX ORDER — HYOSCYAMINE SULFATE 0.12 MG/1
DISSOLVE 1 TABLET UNDER THE TONGUE BEFORE MEALS TABLET SUBLINGUAL
Qty: 270 | Refills: 1 | OUTPATIENT

## 2024-06-18 RX ORDER — HYOSCYAMINE SULFATE 0.12 MG/1
DISSOLVE 1 TABLET UNDER THE TONGUE BEFORE MEALS TABLET SUBLINGUAL
Qty: 270 TABLET | Refills: 1 | OUTPATIENT

## 2025-01-13 ENCOUNTER — ERX REFILL RESPONSE (OUTPATIENT)
Dept: URBAN - METROPOLITAN AREA CLINIC 80 | Facility: CLINIC | Age: 70
End: 2025-01-13

## 2025-01-13 ENCOUNTER — TELEPHONE ENCOUNTER (OUTPATIENT)
Dept: URBAN - METROPOLITAN AREA CLINIC 80 | Facility: CLINIC | Age: 70
End: 2025-01-13

## 2025-01-13 RX ORDER — PANTOPRAZOLE SODIUM 40 MG/1
TAKE 1 TABLET BY MOUTH EVERY DAY TABLET, DELAYED RELEASE ORAL
Qty: 90 TABLET | Refills: 0 | OUTPATIENT

## 2025-01-13 RX ORDER — PANTOPRAZOLE SODIUM 40 MG/1
TAKE 1 TABLET BY MOUTH EVERY DAY TABLET, DELAYED RELEASE ORAL
Qty: 90 TABLET | Refills: 2 | OUTPATIENT

## 2025-01-14 ENCOUNTER — OFFICE VISIT (OUTPATIENT)
Dept: URBAN - METROPOLITAN AREA CLINIC 80 | Facility: CLINIC | Age: 70
End: 2025-01-14
Payer: MEDICARE

## 2025-01-14 VITALS
WEIGHT: 134 LBS | DIASTOLIC BLOOD PRESSURE: 80 MMHG | SYSTOLIC BLOOD PRESSURE: 120 MMHG | BODY MASS INDEX: 21.03 KG/M2 | HEART RATE: 68 BPM | HEIGHT: 67 IN

## 2025-01-14 DIAGNOSIS — K58.0 IRRITABLE BOWEL SYNDROME WITH DIARRHEA: ICD-10-CM

## 2025-01-14 DIAGNOSIS — N18.9 CKD (CHRONIC KIDNEY DISEASE): ICD-10-CM

## 2025-01-14 DIAGNOSIS — K29.70 GASTRITIS: ICD-10-CM

## 2025-01-14 DIAGNOSIS — R15.9 FECAL INCONTINENCE: ICD-10-CM

## 2025-01-14 PROBLEM — 197125005: Status: ACTIVE | Noted: 2025-01-14

## 2025-01-14 PROCEDURE — 99213 OFFICE O/P EST LOW 20 MIN: CPT | Performed by: PHYSICIAN ASSISTANT

## 2025-01-14 RX ORDER — PANTOPRAZOLE SODIUM 40 MG/1
TAKE 1 TABLET BY MOUTH EVERY DAY TABLET, DELAYED RELEASE ORAL
Qty: 90 TABLET | Refills: 0 | Status: ACTIVE | COMMUNITY

## 2025-01-14 RX ORDER — DICYCLOMINE HYDROCHLORIDE 20 MG/1
1 TABLET TABLET ORAL
Qty: 120 | Refills: 11 | OUTPATIENT
Start: 2025-01-14 | End: 2026-01-09

## 2025-01-14 RX ORDER — PANTOPRAZOLE SODIUM 40 MG/1
1 TABLET TABLET, DELAYED RELEASE ORAL ONCE A DAY
Qty: 90 TABLET | Refills: 3 | OUTPATIENT
Start: 2025-01-14

## 2025-01-14 RX ORDER — HYOSCYAMINE SULFATE 0.12 MG/1
DISSOLVE 1 TABLET UNDER THE TONGUE BEFORE MEALS TABLET SUBLINGUAL
Qty: 270 TABLET | Refills: 1 | Status: ACTIVE | COMMUNITY

## 2025-01-14 RX ORDER — RIFAXIMIN 550 MG/1
1 TABLET TABLET ORAL THREE TIMES A DAY
Qty: 42 TABLET | Refills: 1 | OUTPATIENT
Start: 2025-01-14 | End: 2025-02-11

## 2025-01-14 NOTE — HPI-TODAY'S VISIT:
Pt feels his stools are more soft - BSS#5 - started about 2 weeks ago normal is 1 BM a day and more formed and BSS #3 having 1-2 BM now he likes to eat ice a lot - because he is limited to 32 oz of fluid a day - this helps him control his craving for more liquid he takes Levsin - sometimes not making it to the restroom - will have an accident - that started 6-7 months ago no abd pain no heartburn or indigesiton no BRBPR but stool is dark since on binder last egd a year ago wnl last colon 2 years ago - fair prep

## 2025-01-15 ENCOUNTER — TELEPHONE ENCOUNTER (OUTPATIENT)
Dept: URBAN - METROPOLITAN AREA CLINIC 80 | Facility: CLINIC | Age: 70
End: 2025-01-15

## 2025-01-16 ENCOUNTER — TELEPHONE ENCOUNTER (OUTPATIENT)
Dept: URBAN - METROPOLITAN AREA CLINIC 80 | Facility: CLINIC | Age: 70
End: 2025-01-16

## 2025-01-17 ENCOUNTER — TELEPHONE ENCOUNTER (OUTPATIENT)
Dept: URBAN - METROPOLITAN AREA CLINIC 80 | Facility: CLINIC | Age: 70
End: 2025-01-17

## 2025-02-10 ENCOUNTER — TELEPHONE ENCOUNTER (OUTPATIENT)
Dept: URBAN - METROPOLITAN AREA CLINIC 80 | Facility: CLINIC | Age: 70
End: 2025-02-10

## 2025-02-10 RX ORDER — PANCRELIPASE 36000; 180000; 114000 [USP'U]/1; [USP'U]/1; [USP'U]/1
2 TABLETS WITH MEALS, AND 1 TABLET WITH SNACKS CAPSULE, DELAYED RELEASE PELLETS ORAL
Qty: 900 | Refills: 3 | OUTPATIENT
Start: 2025-02-11 | End: 2026-02-06